# Patient Record
Sex: FEMALE | Race: WHITE | Employment: OTHER | URBAN - METROPOLITAN AREA
[De-identification: names, ages, dates, MRNs, and addresses within clinical notes are randomized per-mention and may not be internally consistent; named-entity substitution may affect disease eponyms.]

---

## 2017-03-28 DIAGNOSIS — F34.1 DYSTHYMIA: ICD-10-CM

## 2017-03-28 RX ORDER — BUPROPION HYDROCHLORIDE 300 MG/1
300 TABLET ORAL EVERY MORNING
Qty: 90 TABLET | Refills: 1 | Status: SHIPPED | OUTPATIENT
Start: 2017-03-28 | End: 2017-09-11

## 2017-03-28 RX ORDER — ESCITALOPRAM OXALATE 20 MG/1
20 TABLET ORAL DAILY
Qty: 90 TABLET | Refills: 1 | Status: SHIPPED | OUTPATIENT
Start: 2017-03-28 | End: 2017-09-11

## 2017-03-28 NOTE — TELEPHONE ENCOUNTER
Escitalopram  20mg     Last Written Prescription Date: 12/13/2016 #90 x 0  Last filled 12/13/2016  Last Office Visit with [facility] primary care provider:  09/12/2016 NAEL Clinton        Last PHQ-9 score on record=   PHQ-9 SCORE 9/12/2016   Total Score -   Total Score 4     Bupropion XL 300mg       Last Written Prescription Date: 12/13/2016 #90 x 0  Last filled 12/13/2016  Last Office Visit with [facility], [facility] or [facility] prescribing provider:  09/12/2016 NAEL Clinton        Last PHQ-9 score on record=   PHQ-9 SCORE 9/12/2016   Total Score -   Total Score 4       Lab Results   Component Value Date    AST 23 06/08/2012     Lab Results   Component Value Date    ALT 14 06/08/2012

## 2017-03-28 NOTE — TELEPHONE ENCOUNTER
Prescription approved per [facility] Refill Protocol or patient Primary care provider (PCP)  DERRICK Weiss RN/Christian Bond

## 2017-09-08 NOTE — PATIENT INSTRUCTIONS
Preventive Health Recommendations  Female Ages 65 +      *   Yearly eye visit.     *   Overall, I don't see any changes.     *    Turn in the FIT card.     *    Designated a health care agent who can speak for you if you can't communicate your wishes.     *    Not sure about the rash, looks like psoriasis.       Yearly exam:     See your health care provider every year in order to  o Review health changes.   o Discuss preventive care.    o Review your medicines if your doctor has prescribed any.      You no longer need a yearly Pap test unless you've had an abnormal Pap test in the past 10 years. If you have vaginal symptoms, such as bleeding or discharge, be sure to talk with your provider about a Pap test.      Every 1 to 2 years, have a mammogram.  If you are over 69, talk with your health care provider about whether or not you want to continue having screening mammograms.      Every 10 years, have a colonoscopy. Or, have a yearly FIT test (stool test). These exams will check for colon cancer.       Have a cholesterol test every 5 years, or more often if your doctor advises it.       Have a diabetes test (fasting glucose) every three years. If you are at risk for diabetes, you should have this test more often.       At age 65, have a bone density scan (DEXA) to check for osteoporosis (brittle bone disease).    Shots:    Get a flu shot each year.    Get a tetanus shot every 10 years.    Talk to your doctor about your pneumonia vaccines. There are now two you should receive - Pneumovax (PPSV 23) and Prevnar (PCV 13).    Talk to your doctor about the shingles vaccine.    Talk to your doctor about the hepatitis B vaccine.    Nutrition:     Eat at least 5 servings of fruits and vegetables each day.      Eat whole-grain bread, whole-wheat pasta and brown rice instead of white grains and rice.      Talk to your provider about Calcium and Vitamin D.     Lifestyle    Exercise at least 150 minutes a week (30 minutes a  day, 5 days a week). This will help you control your weight and prevent disease.      Limit alcohol to one drink per day.      No smoking.       Wear sunscreen to prevent skin cancer.       See your dentist twice a year for an exam and cleaning.      See your eye doctor every 1 to 2 years to screen for conditions such as glaucoma, macular degeneration, cataracts, etc

## 2017-09-08 NOTE — PROGRESS NOTES
SUBJECTIVE:   CC: Cecelia Zurita is an 76 year old woman who presents for preventive health visit.     Healthy Habits:    Do you get at least three servings of calcium containing foods daily (dairy, green leafy vegetables, etc.)? NO    Amount of exercise or daily activities, outside of work: Walking dog 20 minutes per day    Problems taking medications regularly No    Medication side effects: No    Have you had an eye exam in the past two years? yes    Do you see a dentist twice per year? yes    Do you have sleep apnea, excessive snoring or daytime drowsiness?no      Depression Followup  Lexapro 20mg qd, Wellbutrin XL 300mg qd    Status since last visit: Stable     See PHQ-9 for current symptoms.  Other associated symptoms: None    Complicating factors:   Significant life event:  No   Current substance abuse:  None  Anxiety or Panic symptoms:  No    PHQ-9  English  PHQ-9   Any Language    Hypothyroidism Follow-up  Nature-throid 97.5mg qd  Patient is not taking any current medications for this    Since last visit, patient describes the following symptoms: dry skin    - Rash on scalp x years. This is itching.    - Mammogram last completed 9/16/16, patient is recommended to complete this yearly.    - Patient last completed colonoscopy 6/2/2004. Results were normal, she denies any FHx of colon cancer. She is requesting FIT testing.    Today's PHQ-2 Score: PHQ-2 ( 1999 Pfizer) 9/12/2016   Q1: Little interest or pleasure in doing things 1   Q2: Feeling down, depressed or hopeless 1   PHQ-2 Score 2         Abuse: Current or Past(Physical, Sexual or Emotional)- No  Do you feel safe in your environment - Yes  Social History   Substance Use Topics     Smoking status: Never Smoker     Smokeless tobacco: Not on file     Alcohol use Yes      Comment: 2-3 drinks per week     The patient does not drink >3 drinks per day nor >7 drinks per week.    Reviewed orders with patient.  Reviewed health maintenance and updated orders  "accordingly - Yes      Patient over age 75, has elected to continue with mammography screening.    Pertinent mammograms are reviewed under the imaging tab.  History of abnormal Pap smear: NO - age 30- 65 PAP every 3 years recommended  Last 3 Pap Results: No results found for: PAP    Reviewed and updated as needed this visit by clinical staff         Reviewed and updated as needed this visit by Provider            ROS:  C: NEGATIVE for fever, chills, change in weight  I: NEGATIVE for worrisome rashes, moles or lesions  E: NEGATIVE for vision changes or irritation  ENT: NEGATIVE for ear, mouth and throat problems  R: NEGATIVE for significant cough or SOB  B: NEGATIVE for masses, tenderness or discharge  CV: NEGATIVE for chest pain, palpitations or peripheral edema  GI: NEGATIVE for nausea, abdominal pain, heartburn, or change in bowel habits  : NEGATIVE for unusual urinary or vaginal symptoms. No vaginal bleeding.  M: NEGATIVE for significant arthralgias or myalgia  N: NEGATIVE for weakness, dizziness or paresthesias  P: NEGATIVE for changes in mood or affect     This document serves as a record of the services and decisions personally performed and made by Diya Berry MD. It was created on his behalf by Chris Lenz, a trained medical scribe. The creation of this document is based the provider's statements to the medical scribe.  Chris Lenz 10:50 AM September 11, 2017  OBJECTIVE:   /80  Pulse 80  Ht 5' 3.5\" (1.613 m)  Wt 151 lb 6 oz (68.7 kg)  BMI 26.39 kg/m2       EXAM:  GENERAL: healthy, alert and no distress  EYES: Eyes grossly normal to inspection, conjunctivae and sclerae normal  RESP: lungs clear to auscultation - no rales, rhonchi or wheezes  CV: regular rate and rhythm, normal S1 S2, no murmur  MS: no gross musculoskeletal defects noted, no edema  NEURO: Normal strength and tone, mentation intact and speech normal  PSYCH: mentation appears normal, affect " "normal/bright        ASSESSMENT/PLAN:     (Z00.00) Routine general medical examination at a health care facility  (primary encounter diagnosis)  Comment: Reviewed lifestyle, current conditions, medications, routine screenings, labs.  Plan: Annual physical in 1 year, sooner for other health maintenance.     (Z13.6) CARDIOVASCULAR SCREENING; LDL GOAL LESS THAN 160  Comment: Patient is not on statin therapy. Results pending.   Plan: Lipid panel reflex to direct LDL    (E03.9) Hypothyroidism  Comment: Routine check, results pending.   Plan: TSH    (Z13.1) Screening for diabetes mellitus  Comment: Routine check, results pending.   Plan: Glucose    (F34.1) Dysthymia  Comment: Doing well with SSRI therapy and wellbutrin. Medication refilled, results pending.   Plan: buPROPion (WELLBUTRIN XL) 300 MG 24 hr tablet,         escitalopram (LEXAPRO) 20 MG tablet, DEPRESSION        ACTION PLAN (DAP)            (K31.89) Gastric hyperacidity  Comment: Doing well with PPI therapy. Medication refilled.    Plan: omeprazole 20 MG tablet    (Z12.11) Colon cancer screening  Comment: FIT card provided.   Plan: Fecal colorectal cancer screen (FIT)    (M35.3) Polymyalgia rheumatica (H)  Comment: Resolved.   Plan: Continue to monitor        COUNSELING:   Reviewed preventive health counseling, as reflected in patient instructions       Regular exercise       Healthy diet/nutrition         reports that she has never smoked. She does not have any smokeless tobacco history on file.    Estimated body mass index is 26.64 kg/(m^2) as calculated from the following:    Height as of 9/12/16: 5' 3.5\" (1.613 m).    Weight as of 9/12/16: 152 lb 12.8 oz (69.3 kg).         Counseling Resources:  ATP IV Guidelines  Pooled Cohorts Equation Calculator  Breast Cancer Risk Calculator  FRAX Risk Assessment  ICSI Preventive Guidelines  Dietary Guidelines for Americans, 2010  USDA's MyPlate  ASA Prophylaxis  Lung CA Screening  The information in this document, " created by a scribe for me, accurately reflects the services I personally performed and the decisions made by me. I have reviewed and approved this document for accuracy. 10:49 AM 9/11/2017    Diya Berry MD  [facility]

## 2017-09-11 ENCOUNTER — OFFICE VISIT (OUTPATIENT)
Dept: FAMILY MEDICINE | Facility: CLINIC | Age: 77
End: 2017-09-11
Payer: COMMERCIAL

## 2017-09-11 VITALS
WEIGHT: 151.38 LBS | BODY MASS INDEX: 25.84 KG/M2 | DIASTOLIC BLOOD PRESSURE: 80 MMHG | HEART RATE: 80 BPM | HEIGHT: 64 IN | SYSTOLIC BLOOD PRESSURE: 130 MMHG

## 2017-09-11 DIAGNOSIS — Z13.1 SCREENING FOR DIABETES MELLITUS: ICD-10-CM

## 2017-09-11 DIAGNOSIS — K31.89 GASTRIC HYPERACIDITY: ICD-10-CM

## 2017-09-11 DIAGNOSIS — Z12.11 COLON CANCER SCREENING: ICD-10-CM

## 2017-09-11 DIAGNOSIS — Z13.6 CARDIOVASCULAR SCREENING; LDL GOAL LESS THAN 160: ICD-10-CM

## 2017-09-11 DIAGNOSIS — E03.9 HYPOTHYROIDISM, UNSPECIFIED TYPE: ICD-10-CM

## 2017-09-11 DIAGNOSIS — F34.1 DYSTHYMIA: ICD-10-CM

## 2017-09-11 DIAGNOSIS — M35.3 POLYMYALGIA RHEUMATICA (H): ICD-10-CM

## 2017-09-11 DIAGNOSIS — Z00.00 ROUTINE GENERAL MEDICAL EXAMINATION AT A HEALTH CARE FACILITY: Primary | ICD-10-CM

## 2017-09-11 LAB
CHOLEST SERPL-MCNC: 238 MG/DL
GLUCOSE SERPL-MCNC: 87 MG/DL (ref 70–99)
HDLC SERPL-MCNC: 85 MG/DL
LDLC SERPL CALC-MCNC: 136 MG/DL
NONHDLC SERPL-MCNC: 153 MG/DL
TRIGL SERPL-MCNC: 83 MG/DL
TSH SERPL DL<=0.005 MIU/L-ACNC: 0.83 MU/L (ref 0.4–4)

## 2017-09-11 PROCEDURE — 36415 COLL VENOUS BLD VENIPUNCTURE: CPT | Performed by: FAMILY MEDICINE

## 2017-09-11 PROCEDURE — 99397 PER PM REEVAL EST PAT 65+ YR: CPT | Performed by: FAMILY MEDICINE

## 2017-09-11 PROCEDURE — 84443 ASSAY THYROID STIM HORMONE: CPT | Performed by: FAMILY MEDICINE

## 2017-09-11 PROCEDURE — 80061 LIPID PANEL: CPT | Performed by: FAMILY MEDICINE

## 2017-09-11 PROCEDURE — 82947 ASSAY GLUCOSE BLOOD QUANT: CPT | Performed by: FAMILY MEDICINE

## 2017-09-11 RX ORDER — BUPROPION HYDROCHLORIDE 300 MG/1
300 TABLET ORAL EVERY MORNING
Qty: 90 TABLET | Refills: 1 | Status: SHIPPED | OUTPATIENT
Start: 2017-09-11 | End: 2018-07-02

## 2017-09-11 RX ORDER — ESCITALOPRAM OXALATE 20 MG/1
20 TABLET ORAL DAILY
Qty: 90 TABLET | Refills: 1 | Status: SHIPPED | OUTPATIENT
Start: 2017-09-11 | End: 2018-07-02

## 2017-09-11 RX ORDER — NICOTINE POLACRILEX 4 MG/1
20 GUM, CHEWING ORAL DAILY
Qty: 90 TABLET | Refills: 1 | Status: SHIPPED | OUTPATIENT
Start: 2017-09-11

## 2017-09-11 ASSESSMENT — PATIENT HEALTH QUESTIONNAIRE - PHQ9: SUM OF ALL RESPONSES TO PHQ QUESTIONS 1-9: 1

## 2017-09-11 NOTE — LETTER
My Depression Action Plan  Name: Cecelia Zurita   Date of Birth 1940  Date: 9/11/2017    My doctor: Diya Berry   My clinic: [address] [address] [phone]          GREEN    ZONE   Good Control    What it looks like:     Things are going generally well. You have normal up s and down s. You may even feel depressed from time to time, but bad moods usually last less than a day.   What you need to do:  1. Continue to care for yourself (see self care plan)  2. Check your depression survival kit and update it as needed  3. Follow your physician s recommendations including any medication.  4. Do not stop taking medication unless you consult with your physician first.           YELLOW         ZONE Getting Worse    What it looks like:     Depression is starting to interfere with your life.     It may be hard to get out of bed; you may be starting to isolate yourself from others.    Symptoms of depression are starting to last most all day and this has happened for several days.     You may have suicidal thoughts but they are not constant.   What you need to do:     1. Call your care team, your response to treatment will improve if you keep your care team informed of your progress. Yellow periods are signs an adjustment may need to be made.     2. Continue your self-care, even if you have to fake it!    3. Talk to someone in your support network    4. Open up your depression survival kit           RED    ZONE Medical Alert - Get Help    What it looks like:     Depression is seriously interfering with your life.     You may experience these or other symptoms: You can t get out of bed most days, can t work or engage in other necessary activities, you have trouble taking care of basic hygiene, or basic responsibilities, thoughts of suicide or death that will not go away, self-injurious behavior.     What you need to do:  1. Call your care team and  request a same-day appointment. If they are not available (weekends or after hours) call your local crisis line, emergency room or 911.      Electronically signed by: Sandra Harman, September 11, 2017    Depression Self Care Plan / Survival Kit    Self-Care for Depression  Here s the deal. Your body and mind are really not as separate as most people think.  What you do and think affects how you feel and how you feel influences what you do and think. This means if you do things that people who feel good do, it will help you feel better.  Sometimes this is all it takes.  There is also a place for medication and therapy depending on how severe your depression is, so be sure to consult with your medical provider and/ or Behavioral Health Consultant if your symptoms are worsening or not improving.     In order to better manage my stress, I will:    Exercise  Get some form of exercise, every day. This will help reduce pain and release endorphins, the  feel good  chemicals in your brain. This is almost as good as taking antidepressants!  This is not the same as joining a gym and then never going! (they count on that by the way ) It can be as simple as just going for a walk or doing some gardening, anything that will get you moving.      Hygiene   Maintain good hygiene (Get out of bed in the morning, Make your bed, Brush your teeth, Take a shower, and Get dressed like you were going to work, even if you are unemployed).  If your clothes don't fit try to get ones that do.    Diet  I will strive to eat foods that are good for me, drink plenty of water, and avoid excessive sugar, caffeine, alcohol, and other mood-altering substances.  Some foods that are helpful in depression are: complex carbohydrates, B vitamins, flaxseed, fish or fish oil, fresh fruits and vegetables.    Psychotherapy  I agree to participate in Individual Therapy (if recommended).    Medication  If prescribed medications, I agree to take them.  Missing  doses can result in serious side effects.  I understand that drinking alcohol, or other illicit drug use, may cause potential side effects.  I will not stop my medication abruptly without first discussing it with my provider.    Staying Connected With Others  I will stay in touch with my friends, family members, and my primary care provider/team.    Use your imagination  Be creative.  We all have a creative side; it doesn t matter if it s oil painting, sand castles, or mud pies! This will also kick up the endorphins.    Witness Beauty  (AKA stop and smell the roses) Take a look outside, even in mid-winter. Notice colors, textures. Watch the squirrels and birds.     Service to others  Be of service to others.  There is always someone else in need.  By helping others we can  get out of ourselves  and remember the really important things.  This also provides opportunities for practicing all the other parts of the program.    Humor  Laugh and be silly!  Adjust your TV habits for less news and crime-drama and more comedy.    Control your stress  Try breathing deep, massage therapy, biofeedback, and meditation. Find time to relax each day.     My support system    Clinic Contact:  Phone number:    Contact 1:  Phone number:    Contact 2:  Phone number:    Hindu/:  Phone number:    Therapist:  Phone number:    Local crisis center:    Phone number:    Other community support:  Phone number:

## 2017-09-11 NOTE — MR AVS SNAPSHOT
After Visit Summary   9/11/2017    Cecelia Zurita    MRN: [id number]           Patient Information     Date Of Birth          1940        Visit Information        Provider Department      9/11/2017 10:40 AM Diya Berry MD [facility]        Today's Diagnoses     Routine general medical examination at a health care facility    -  1    CARDIOVASCULAR SCREENING; LDL GOAL LESS THAN 160        Screening for diabetes mellitus        Dysthymia        Gastric hyperacidity        Colon cancer screening        Hypothyroidism, unspecified type        Polymyalgia rheumatica (H)          Care Instructions      Preventive Health Recommendations  Female Ages 65 +      *   Yearly eye visit.     *   Overall, I don't see any changes.     *    Turn in the FIT card.     *    Designated a health care agent who can speak for you if you can't communicate your wishes.     *    Not sure about the rash, looks like psoriasis.       Yearly exam:     See your health care provider every year in order to  o Review health changes.   o Discuss preventive care.    o Review your medicines if your doctor has prescribed any.      You no longer need a yearly Pap test unless you've had an abnormal Pap test in the past 10 years. If you have vaginal symptoms, such as bleeding or discharge, be sure to talk with your provider about a Pap test.      Every 1 to 2 years, have a mammogram.  If you are over 69, talk with your health care provider about whether or not you want to continue having screening mammograms.      Every 10 years, have a colonoscopy. Or, have a yearly FIT test (stool test). These exams will check for colon cancer.       Have a cholesterol test every 5 years, or more often if your doctor advises it.       Have a diabetes test (fasting glucose) every three years. If you are at risk for diabetes, you should have this test more often.       At age 65, have a bone density scan (DEXA) to check for  osteoporosis (brittle bone disease).    Shots:    Get a flu shot each year.    Get a tetanus shot every 10 years.    Talk to your doctor about your pneumonia vaccines. There are now two you should receive - Pneumovax (PPSV 23) and Prevnar (PCV 13).    Talk to your doctor about the shingles vaccine.    Talk to your doctor about the hepatitis B vaccine.    Nutrition:     Eat at least 5 servings of fruits and vegetables each day.      Eat whole-grain bread, whole-wheat pasta and brown rice instead of white grains and rice.      Talk to your provider about Calcium and Vitamin D.     Lifestyle    Exercise at least 150 minutes a week (30 minutes a day, 5 days a week). This will help you control your weight and prevent disease.      Limit alcohol to one drink per day.      No smoking.       Wear sunscreen to prevent skin cancer.       See your dentist twice a year for an exam and cleaning.      See your eye doctor every 1 to 2 years to screen for conditions such as glaucoma, macular degeneration, cataracts, etc           Follow-ups after your visit        Future tests that were ordered for you today     Open Future Orders        Priority Expected Expires Ordered    Fecal colorectal cancer screen (FIT) Routine 10/2/2017 12/4/2017 9/11/2017            Who to contact     Normal or non-critical lab and imaging results will be communicated to you by MyChart, letter or phone within 4 business days after the clinic has received the results. If you do not hear from us within 7 days, please contact the clinic through MyChart or phone. If you have a critical or abnormal lab result, we will notify you by phone as soon as possible.  Submit refill requests through [employer] or call your pharmacy and they will forward the refill request to us. Please allow 3 business days for your refill to be completed.          If you need to speak with a  for additional information , please call: [phone]           Additional  "Information About Your Visit        MyChart Information     [employer] lets you send messages to your doctor, view your test results, renew your prescriptions, schedule appointments and more. To sign up, go to [email or web address] . Click on \"Log in\" on the left side of the screen, which will take you to the Welcome page. Then click on \"Sign up Now\" on the right side of the page.     You will be asked to enter the access code listed below, as well as some personal information. Please follow the directions to create your username and password.     Your access code is: [email or web address]  Expires: 12/10/2017 11:01 AM     Your access code will  in 90 days. If you need help or a new code, please call your [place] clinic or [phone].        Care EveryWhere ID     This is your Care EveryWhere ID. This could be used by other organizations to access your [place] medical records  [id number]        Your Vitals Were     Pulse Height BMI (Body Mass Index)             80 5' 3.5\" (1.613 m) 26.39 kg/m2          Blood Pressure from Last 3 Encounters:   17 130/80   16 112/74   16 130/80    Weight from Last 3 Encounters:   17 151 lb 6 oz (68.7 kg)   16 152 lb 12.8 oz (69.3 kg)   16 149 lb (67.6 kg)              We Performed the Following     DEPRESSION ACTION PLAN (DAP)     Glucose     Lipid panel reflex to direct LDL     TSH          Today's Medication Changes          These changes are accurate as of: 17 11:05 AM.  If you have any questions, ask your nurse or doctor.               These medicines have changed or have updated prescriptions.        Dose/Directions    buPROPion 300 MG 24 hr tablet   Commonly known as:  WELLBUTRIN XL   This may have changed:  additional instructions   Used for:  Dysthymia   Changed by:  Diya Berry MD        Dose:  300 mg   Take 1 tablet (300 mg) by mouth every morning   Quantity:  90 tablet   Refills:  1       escitalopram 20 MG tablet   Commonly " known as:  LEXAPRO   This may have changed:  additional instructions   Used for:  Dysthymia   Changed by:  Diya Berry MD        Dose:  20 mg   Take 1 tablet (20 mg) by mouth daily   Quantity:  90 tablet   Refills:  1         Stop taking these medicines if you haven't already. Please contact your care team if you have questions.     azithromycin 250 MG tablet   Commonly known as:  ZITHROMAX   Stopped by:  Diya Berry MD                Where to get your medicines      These medications were sent to [facility] [address] [place] [address] [address] AT [facility]  [address], [facility] [address]     Phone:  [phone]     buPROPion 300 MG 24 hr tablet    escitalopram 20 MG tablet    omeprazole 20 MG tablet                Primary Care Provider Office Phone # Fax #    Diya Berry [phone] [address] [place] [address]        Equal Access to Services     [facility]: Hadii aad ku hadasho Soomaali, waaxda luqadaha, qaybta kaalmada adeegyada, waxay idiin hayaan adeeg kharash la'kim . So [facility] [phone].    ATENCIÓN: Si habla español, tiene a wells disposición servicios gratuitos de asistencia lingüística. [facility] [phone].    We comply with applicable federal civil rights laws and [place] laws. We do not discriminate on the basis of race, color, national origin, age, disability sex, sexual orientation or gender identity.            Thank you!     Thank you for choosing [facility]  for your care. Our goal is always to provide you with excellent care. Hearing back from our patients is one way we can continue to improve our services. Please take a few minutes to complete the written survey that you may receive in the mail after your visit with us. Thank you!             Your Updated Medication List - Protect others around you: Learn how to safely use, store and throw away your medicines at  www.disposemymeds.org.          This list is accurate as of: 9/11/17 11:05 AM.  Always use your most recent med list.                   Brand Name Dispense Instructions for use Diagnosis    B COMPLEX PO      Take  by mouth.        buPROPion 300 MG 24 hr tablet    WELLBUTRIN XL    90 tablet    Take 1 tablet (300 mg) by mouth every morning    Dysthymia       escitalopram 20 MG tablet    LEXAPRO    90 tablet    Take 1 tablet (20 mg) by mouth daily    Dysthymia       meclizine 25 MG tablet    ANTIVERT    30 tablet    Take 1 tablet by mouth every 6 hours as needed. For vertigo    Vertigo       NATURE-THROID 65 MG Tabs   Generic drug:  Thyroid      97.5 Doses        OMEGA 3 PO      Take  by mouth. Omega Red daily.        omeprazole 20 MG tablet     90 tablet    Take 1 tablet (20 mg) by mouth daily For stomach.    Gastric hyperacidity       VITAMIN D3 PO      Take 2,000 Units by mouth daily

## 2017-09-11 NOTE — NURSING NOTE
"Chief Complaint   Patient presents with     Physical       Initial /80  Pulse 80  Ht 5' 3.5\" (1.613 m)  Wt 151 lb 6 oz (68.7 kg)  BMI 26.39 kg/m2 Estimated body mass index is 26.39 kg/(m^2) as calculated from the following:    Height as of this encounter: 5' 3.5\" (1.613 m).    Weight as of this encounter: 151 lb 6 oz (68.7 kg).  Medication Reconciliation: complete  "

## 2017-09-11 NOTE — LETTER
September 18, 2017      Cecelia Zurita  [address] [place] [address]        Dear ,    We are writing to inform you of your test results.    Your blood to show that you have normal thyroid function and there is no signs of diabetes. Please see enclosed copies.     Your cholesterol is slightly elevated, but you have a great HDL, the protective cholesterol. There is no need for cholesterol-lowering medication at this time.     Overall, I think you're doing well. I would recommend that you continue on your current medications and come back for a follow-up physical in one year.    Resulted Orders   Lipid panel reflex to direct LDL   Result Value Ref Range    Cholesterol 238 (H) <200 mg/dL      Comment:      Desirable:       <200 mg/dl    Triglycerides 83 <150 mg/dL    HDL Cholesterol 85 >49 mg/dL    LDL Cholesterol Calculated 136 (H) <100 mg/dL      Comment:      Above desirable:  100-129 mg/dl  Borderline High:  130-159 mg/dL  High:             160-189 mg/dL  Very high:       >189 mg/dl      Non HDL Cholesterol 153 (H) <130 mg/dL      Comment:      Above Desirable:  130-159 mg/dl  Borderline high:  160-189 mg/dl  High:             190-219 mg/dl  Very high:       >219 mg/dl     TSH   Result Value Ref Range    TSH 0.83 0.40 - 4.00 mU/L   Glucose   Result Value Ref Range    Glucose 87 70 - 99 mg/dL       If you have any questions or concerns, please call the clinic at the number listed above.       Sincerely,        Diya Berry MD

## 2017-09-14 PROCEDURE — G0328 FECAL BLOOD SCRN IMMUNOASSAY: HCPCS | Performed by: FAMILY MEDICINE

## 2017-09-15 DIAGNOSIS — Z12.11 COLON CANCER SCREENING: ICD-10-CM

## 2017-09-17 LAB — HEMOCCULT STL QL IA: NEGATIVE

## 2017-09-18 ENCOUNTER — EXTERNAL ORDER RESULTS (OUTPATIENT)
Dept: HEALTH INFORMATION MANAGEMENT | Facility: CLINIC | Age: 77
End: 2017-09-18

## 2017-09-18 ENCOUNTER — TRANSFERRED RECORDS (OUTPATIENT)
Dept: HEALTH INFORMATION MANAGEMENT | Facility: CLINIC | Age: 77
End: 2017-09-18

## 2017-09-19 NOTE — PROGRESS NOTES
Please abstract the following data from this visit with this patient into the appropriate field in Epic:    Mammogram done on this date: 9/18/2017 (approximately), by this group: [facility], results were NORMAL, repeat yearly.

## 2017-10-05 ENCOUNTER — APPOINTMENT (RX ONLY)
Dept: URBAN - METROPOLITAN AREA CLINIC 167 | Facility: CLINIC | Age: 77
Setting detail: DERMATOLOGY
End: 2017-10-05

## 2017-10-05 DIAGNOSIS — L21.8 OTHER SEBORRHEIC DERMATITIS: ICD-10-CM

## 2017-10-05 DIAGNOSIS — L08.9 LOCAL INFECTION OF THE SKIN AND SUBCUTANEOUS TISSUE, UNSPECIFIED: ICD-10-CM

## 2017-10-05 PROBLEM — E03.9 HYPOTHYROIDISM, UNSPECIFIED: Status: ACTIVE | Noted: 2017-10-05

## 2017-10-05 PROCEDURE — 99202 OFFICE O/P NEW SF 15 MIN: CPT

## 2017-10-05 PROCEDURE — ? OTHER

## 2017-10-05 PROCEDURE — ? TREATMENT REGIMEN

## 2017-10-05 PROCEDURE — ? ORDER TESTS

## 2017-10-05 PROCEDURE — ? COUNSELING

## 2017-10-05 PROCEDURE — ? PRESCRIPTION

## 2017-10-05 RX ORDER — CLOBETASOL PROPIONATE 0.5 MG/ML
SOLUTION TOPICAL
Qty: 1 | Refills: 3 | Status: ERX | COMMUNITY
Start: 2017-10-05

## 2017-10-05 RX ORDER — BETAMETHASONE DIPROPIONATE 0.5 MG/G
OINTMENT TOPICAL
Qty: 1 | Refills: 1 | Status: ERX | COMMUNITY
Start: 2017-10-05

## 2017-10-05 RX ORDER — DOXYCYCLINE 100 MG/1
TABLET, FILM COATED ORAL
Qty: 10 | Refills: 0 | Status: ERX | COMMUNITY
Start: 2017-10-05

## 2017-10-05 RX ADMIN — BETAMETHASONE DIPROPIONATE: 0.5 OINTMENT TOPICAL at 22:59

## 2017-10-05 RX ADMIN — CLOBETASOL PROPIONATE: 0.5 SOLUTION TOPICAL at 22:58

## 2017-10-05 RX ADMIN — DOXYCYCLINE: 100 TABLET, FILM COATED ORAL at 22:57

## 2017-10-05 ASSESSMENT — LOCATION DETAILED DESCRIPTION DERM
LOCATION DETAILED: LEFT CENTRAL FRONTAL SCALP
LOCATION DETAILED: LEFT CENTRAL PARIETAL SCALP

## 2017-10-05 ASSESSMENT — LOCATION ZONE DERM: LOCATION ZONE: SCALP

## 2017-10-05 ASSESSMENT — LOCATION SIMPLE DESCRIPTION DERM: LOCATION SIMPLE: SCALP

## 2017-10-05 NOTE — PROCEDURE: TREATMENT REGIMEN
Initiate Treatment: Doxycycline 100mg take 1 by mouth once a day x 10 days
Detail Level: Zone
Otc Regimen: Vinegar soaks 1 tablespoon of vinegar for every cup of water ( put mixture into a spray bottle and apply before showering or while in the shower and let it sit for 5-10 minutes)
Initiate Treatment: Clobetasol 0.05% scalp solution \\nBetamethasone, augmented apply to affected area of the scalp and ears twice a day up to two weeks then one

## 2017-10-05 NOTE — PROCEDURE: OTHER
Other (Free Text): Rash on scalp and in ears\\nItchy\\nFlaking \\nSeveral years \\nPatient is currently not treating her scalp \\nPatient was given a prescription for Topicort spray but did not work \\nAlso cicloprox shampoo also no help \\nSpreading\\nPatient is shampooing with a shampoo with tea tree oil in it\\n\\nIs thick and scaly\\nWill decrease inflammation with topical steroids\\nMay be secondarily infected as well - erosions, crusted - will treat with doxy (amox allergy)
Note Text (......Xxx Chief Complaint.): This diagnosis correlates with the
Detail Level: Detailed

## 2017-10-25 ENCOUNTER — APPOINTMENT (RX ONLY)
Dept: URBAN - METROPOLITAN AREA CLINIC 167 | Facility: CLINIC | Age: 77
Setting detail: DERMATOLOGY
End: 2017-10-25

## 2017-10-25 DIAGNOSIS — L21.8 OTHER SEBORRHEIC DERMATITIS: ICD-10-CM | Status: IMPROVED

## 2017-10-25 DIAGNOSIS — L08.9 LOCAL INFECTION OF THE SKIN AND SUBCUTANEOUS TISSUE, UNSPECIFIED: ICD-10-CM | Status: RESOLVED

## 2017-10-25 DIAGNOSIS — I78.8 OTHER DISEASES OF CAPILLARIES: ICD-10-CM

## 2017-10-25 PROCEDURE — ? COUNSELING

## 2017-10-25 PROCEDURE — ? OTHER

## 2017-10-25 PROCEDURE — ? TREATMENT REGIMEN

## 2017-10-25 PROCEDURE — 99213 OFFICE O/P EST LOW 20 MIN: CPT

## 2017-10-25 ASSESSMENT — LOCATION DETAILED DESCRIPTION DERM
LOCATION DETAILED: LEFT CENTRAL PARIETAL SCALP
LOCATION DETAILED: LEFT ANTERIOR DISTAL THIGH
LOCATION DETAILED: LEFT CENTRAL FRONTAL SCALP

## 2017-10-25 ASSESSMENT — LOCATION SIMPLE DESCRIPTION DERM
LOCATION SIMPLE: LEFT THIGH
LOCATION SIMPLE: SCALP

## 2017-10-25 ASSESSMENT — LOCATION ZONE DERM
LOCATION ZONE: LEG
LOCATION ZONE: SCALP

## 2017-10-25 NOTE — PROCEDURE: OTHER
Other (Free Text): Seborrheic dermatitis on scalp\\nOriginally did not respond to topicort spray or ciclopirox shampoo for years\\nClobetasol scalp solution - a little stinging but tolerable\\nBetamethasone behind ears and on scalp helped a lot\\nIs almost resolved\\n\\n
Detail Level: Detailed
Note Text (......Xxx Chief Complaint.): This diagnosis correlates with the
Other (Free Text): Had cx-proven skin infection with MSSA (sensitive to doxy) and Acinetobacter Iwoffi (TCN sensitivity not tested)\\nTook doxycycline 100mg one by mouth once daily x 10 days (ampicillin allergy)\\nRapid improvement\\nInfection appears resolved\\nIf it gets crusted again, can start with vinegar soaks in a spray bottle prior to a shower\\n
Other (Free Text): Rash on left thigh that comes with heat, but is currently clear\\nI cannot confirm what it is, but it sounds like they are dilated blood vessels.\\n

## 2017-10-25 NOTE — PROCEDURE: TREATMENT REGIMEN
Detail Level: Zone
Otc Regimen: Vinegar soaks 1 tablespoon of vinegar for every cup of water ( put mixture into a spray bottle and apply before showering or while in the shower and let it sit for 5-10 minutes) for prevention
Continue Regimen: Clobetasol 0.05% scalp Solution Apply to the affected area of the scalp twice daily up to two weeks per month / as needed \\nBetamethasone, augmented Apply to the affected area of the scalp and ears twice daily up to two weeks per month / as needed

## 2018-05-03 ENCOUNTER — APPOINTMENT (RX ONLY)
Dept: URBAN - METROPOLITAN AREA CLINIC 167 | Facility: CLINIC | Age: 78
Setting detail: DERMATOLOGY
End: 2018-05-03

## 2018-05-03 DIAGNOSIS — L08.9 LOCAL INFECTION OF THE SKIN AND SUBCUTANEOUS TISSUE, UNSPECIFIED: ICD-10-CM

## 2018-05-03 DIAGNOSIS — L21.8 OTHER SEBORRHEIC DERMATITIS: ICD-10-CM

## 2018-05-03 DIAGNOSIS — L57.0 ACTINIC KERATOSIS: ICD-10-CM

## 2018-05-03 PROCEDURE — ? COUNSELING

## 2018-05-03 PROCEDURE — ? LIQUID NITROGEN

## 2018-05-03 PROCEDURE — 17003 DESTRUCT PREMALG LES 2-14: CPT

## 2018-05-03 PROCEDURE — 99213 OFFICE O/P EST LOW 20 MIN: CPT | Mod: 25

## 2018-05-03 PROCEDURE — ? PRESCRIPTION

## 2018-05-03 PROCEDURE — ? TREATMENT REGIMEN

## 2018-05-03 PROCEDURE — ? ORDER TESTS

## 2018-05-03 PROCEDURE — 17000 DESTRUCT PREMALG LESION: CPT

## 2018-05-03 PROCEDURE — ? OTHER

## 2018-05-03 RX ORDER — KETOCONAZOLE 20.5 MG/ML
SHAMPOO, SUSPENSION TOPICAL
Qty: 1 | Refills: 11 | Status: ERX | COMMUNITY
Start: 2018-05-03

## 2018-05-03 RX ADMIN — KETOCONAZOLE: 20.5 SHAMPOO, SUSPENSION TOPICAL at 22:23

## 2018-05-03 ASSESSMENT — LOCATION SIMPLE DESCRIPTION DERM
LOCATION SIMPLE: NOSE
LOCATION SIMPLE: LEFT CHEEK
LOCATION SIMPLE: SCALP

## 2018-05-03 ASSESSMENT — LOCATION DETAILED DESCRIPTION DERM
LOCATION DETAILED: LEFT CENTRAL PARIETAL SCALP
LOCATION DETAILED: NASAL DORSUM
LOCATION DETAILED: LEFT MEDIAL MALAR CHEEK
LOCATION DETAILED: LEFT CENTRAL MALAR CHEEK

## 2018-05-03 ASSESSMENT — LOCATION ZONE DERM
LOCATION ZONE: NOSE
LOCATION ZONE: SCALP
LOCATION ZONE: FACE

## 2018-05-03 NOTE — PROCEDURE: OTHER
Detail Level: Detailed
Note Text (......Xxx Chief Complaint.): This diagnosis correlates with the
Other (Free Text): Had pancreatitis and changed her diet\\nPart of her pancreas may have \\nShe just wants to make sure this is not related to her scalp\\n\\nSeborrheic dermatitis on scalp\\nOriginally did not respond to topicort spray or ciclopirox shampoo for years\\nClobetasol scalp solution - a little stinging but tolerable\\nBetamethasone behind ears and on scalp helped a lot in the past\\nWas almost resolved at the last visit\\nIs now scaly and crusted, but not as bad as it has been\\nHas not been using the clobetasol or betamethasone much - can increase use\\nIt also looks like it may have a staph infection - will cx\\nPreviously has an MSSA and Acinetobacter lwoffii group infection

## 2018-05-03 NOTE — PROCEDURE: TREATMENT REGIMEN
Initiate Treatment: Ketoconazole shampoo\\n.
Detail Level: Zone
Continue Regimen: Clobetasol 0.05% scalp Solution Apply to the affected area of the scalp twice daily up to two weeks per month / as needed \\nBetamethasone, augmented Apply to the affected area of the scalp and ears twice daily up to two weeks per month / as needed

## 2018-07-27 DIAGNOSIS — F34.1 DYSTHYMIA: ICD-10-CM

## 2018-07-30 RX ORDER — BUPROPION HYDROCHLORIDE 300 MG/1
TABLET ORAL
Qty: 90 TABLET | Refills: 0 | Status: SHIPPED | OUTPATIENT
Start: 2018-07-30 | End: 2018-09-24

## 2018-07-30 RX ORDER — ESCITALOPRAM OXALATE 20 MG/1
TABLET ORAL
Qty: 90 TABLET | Refills: 0 | Status: SHIPPED | OUTPATIENT
Start: 2018-07-30 | End: 2018-09-24

## 2018-09-17 NOTE — PROGRESS NOTES
SUBJECTIVE:   Cecelia Zurita is a 77 year old female who presents for Preventive Visit.    Are you in the first 12 months of your Medicare Part B coverage?  No    Healthy Habits:    Do you get at least three servings of calcium containing foods daily (dairy, green leafy vegetables, etc.)? Yes, she is eating a low salt and low sugar diet    Amount of exercise or daily activities, outside of work: None this summer due to warm weather    Problems taking medications regularly No    Medication side effects: No    Have you had an eye exam in the past two years? yes    Do you see a dentist twice per year? yes    Do you have sleep apnea, excessive snoring or daytime drowsiness?no      Ability to successfully perform activities of daily living: Yes, no assistance needed    Home safety:  none identified     Hearing impairment: No    Fall risk:  Fallen 2 or more times in the past year?: No  Any fall with injury in the past year?: No  - She is requesting referral for MRCP after dx of pancreatitis 12/2017 and 3/11/2018      Depression Followup  Wellbutrin XL 300mg every day, lexapro 20mg qd    Status since last visit: Stable    See PHQ-9 for current symptoms.  Other associated symptoms: None    Complicating factors:   Significant life event:  No   Current substance abuse:  None  Anxiety or Panic symptoms:  Yes-  Anxiety due to recent dx of pancreatitis and not wanting another flare up    PHQ-9 6/20/2016 9/12/2016 9/11/2017   Total Score 8 4 1   Q9: Suicide Ideation Not at all Not at all Not at all       PHQ-9  English  PHQ-9   Any Language  Suicide Assessment Five-step Evaluation and Treatment (SAFE-T)    Hypothyroidism Follow-up      Since last visit, patient describes the following symptoms: weight loss of 20 lbs (due to dx of pancreatitis) and loose stools    - FIT test last completed 9/14/2017 with normal results. Patient is on a routine annual schedule for this and is due to repeat.      Reviewed and updated as needed  "this visit by clinical staff  Tobacco  Allergies  Meds  Med Hx  Surg Hx  Fam Hx  Soc Hx        Reviewed and updated as needed this visit by Provider        Social History   Substance Use Topics     Smoking status: Never Smoker     Smokeless tobacco: Never Used     Alcohol use Yes      Comment: 2-3 drinks per week       If you drink alcohol do you typically have >3 drinks per day or >7 drinks per week? No                        Today's PHQ-2 Score:   PHQ-2 ( 1999 Pf[employer]) 9/24/2018 9/11/2017   Q1: Little interest or pleasure in doing things 1 0   Q2: Feeling down, depressed or hopeless 1 1   PHQ-2 Score 2 1       Do you feel safe in your environment - Yes    Do you have a Health Care Directive?: Yes: Patient states has Advance Directive and will bring in a copy to clinic.    Current providers sharing in care for this patient include:   Patient Care Team:  Diya Berry MD as PCP - General (Family Practice)    The following health maintenance items are reviewed in Epic and correct as of today:  Health Maintenance   Topic Date Due     PNEUMOCOCCAL (2 of 2 - PCV13) 09/17/2011     PHQ-9 Q6 MONTHS  03/11/2018     ADVANCE DIRECTIVE PLANNING Q5 YRS  06/11/2018     INFLUENZA VACCINE (1) 09/01/2018     TSH Q1 YEAR  09/11/2018     FALL RISK ASSESSMENT  09/11/2018     DEPRESSION ACTION PLAN Q1 YR  09/11/2018     LIPID SCREEN Q5 YR FEMALE (SYSTEM ASSIGNED)  09/11/2022     TETANUS IMMUNIZATION (SYSTEM ASSIGNED)  01/01/2024     DEXA SCAN SCREENING (SYSTEM ASSIGNED)  Completed         Pneumonia Vaccine:Adults age 65+ who received Pneumovax (PPSV23) at 65 years or older: Should be given PCV13 > 1 year after their most recent PPSV23    ROS:  Constitutional, HEENT, cardiovascular, pulmonary, gi and gu systems are negative, except as otherwise noted.    OBJECTIVE:   /80  Pulse 76  Temp 98.9  F (37.2  C) (Tympanic)  Ht 5' 3.5\" (1.613 m)  Wt 131 lb (59.4 kg)  BMI 22.84 kg/m2 Estimated body mass index is 22.84 " "kg/(m^2) as calculated from the following:    Height as of this encounter: 5' 3.5\" (1.613 m).    Weight as of this encounter: 131 lb (59.4 kg).  EXAM:   GENERAL: Healthy, alert and no distress  EYES: Eyes grossly normal to inspection, conjunctivae and sclerae normal  RESP: Lungs clear to auscultation - no rales, rhonchi or wheezes  CV: Regular rate and rhythm, normal S1 S2, no murmur  GI:  soft, nontender, no HSM   MS: No gross musculoskeletal defects noted, no edema  NEURO: Normal strength and tone, mentation intact and speech normal  PSYCH: Mentation appears normal, affect normal/bright     Results for orders placed or performed in visit on 09/24/18   Lipid panel reflex to direct LDL Fasting   Result Value Ref Range    Cholesterol 200 (H) <200 mg/dL    Triglycerides 98 <150 mg/dL    HDL Cholesterol 73 >49 mg/dL    LDL Cholesterol Calculated 107 (H) <100 mg/dL    Non HDL Cholesterol 127 <130 mg/dL   TSH   Result Value Ref Range    TSH 0.84 0.40 - 4.00 mU/L   Comprehensive metabolic panel (BMP + Alb, Alk Phos, ALT, AST, Total. Bili, TP)   Result Value Ref Range    Sodium 136 133 - 144 mmol/L    Potassium 3.8 3.4 - 5.3 mmol/L    Chloride 103 94 - 109 mmol/L    Carbon Dioxide 26 20 - 32 mmol/L    Anion Gap 7 3 - 14 mmol/L    Glucose 85 70 - 99 mg/dL    Urea Nitrogen 18 7 - 30 mg/dL    Creatinine 0.98 0.52 - 1.04 mg/dL    GFR Estimate 55 (L) >60 mL/min/1.7m2    GFR Estimate If Black 67 >60 mL/min/1.7m2    Calcium 8.6 8.5 - 10.1 mg/dL    Bilirubin Total 0.5 0.2 - 1.3 mg/dL    Albumin 3.4 3.4 - 5.0 g/dL    Protein Total 6.4 (L) 6.8 - 8.8 g/dL    Alkaline Phosphatase 90 40 - 150 U/L    ALT 20 0 - 50 U/L    AST 16 0 - 45 U/L   Lipase   Result Value Ref Range    Lipase 113 73 - 393 U/L      ASSESSMENT / PLAN:   (Z00.00) Medicare annual wellness visit, subsequent  (primary encounter diagnosis)      (Z13.6) CARDIOVASCULAR SCREENING; LDL GOAL LESS THAN 160  Comment: Acceptable lipid profile without statin therapy.  Plan: Lipid " "panel reflex to direct LDL Fasting            (F34.1) Dysthymia  Comment: Doing well on current medications.  Plan: buPROPion (WELLBUTRIN XL) 300 MG 24 hr tablet,         escitalopram (LEXAPRO) 20 MG tablet, DEPRESSION        ACTION PLAN (DAP)        Continue.  Follow-up in 6 months.    (Z87.19) Hx of pancreatitis  Comment: Etiology unclear.  Patient does not have a gallbladder, minimal alcohol usage, triglycerides acceptable.  She reports that since I am her primary care physician, she needs referral from me to see her GI specialist in [place] and for a MRCP.  No current signs of liver or pancreatic inflammation based on blood tests.  Plan: Comprehensive metabolic panel (BMP + Alb, Alk         Phos, ALT, AST, Total. Bili, TP), Lipase, MR         Abdomen MRCP w/o & w Contrast, GASTROENTEROLOGY        ADULT REF CONSULT ONLY       (E03.9) Hypothyroidism, unspecified type  Comment: Clinically euthyroid.  TSH therapeutic.  Plan: TSH        Continue on current dose of thyroid medication.  1 year refill.    (Z71.89) Advanced directives, counseling/discussion  Comment: Discussed.      (Z23) Need for prophylactic vaccination and inoculation against influenza  Plan: FLU VACCINE, INCREASED ANTIGEN, PRESV FREE, AGE        65+ [90843], Vaccine Administration, Initial         [17318]            End of Life Planning:  Patient currently has an advanced directive: Yes.  Practitioner is supportive of decision.    COUNSELING:  Reviewed preventive health counseling, as reflected in patient instructions       Regular exercise       Healthy diet/nutrition       Vision screening       Hearing screening       Dental care       Immunizations    Vaccinated for: Influenza          BP Readings from Last 1 Encounters:   09/24/18 124/80     Estimated body mass index is 22.84 kg/(m^2) as calculated from the following:    Height as of this encounter: 5' 3.5\" (1.613 m).    Weight as of this encounter: 131 lb (59.4 kg).           reports that she " has never smoked. She has never used smokeless tobacco.      Appropriate preventive services were discussed with this patient, including applicable screening as appropriate for cardiovascular disease, diabetes, osteopenia/osteoporosis, and glaucoma.  As appropriate for age/gender, discussed screening for colorectal cancer, prostate cancer, breast cancer, and cervical cancer. Checklist reviewing preventive services available has been given to the patient.    Reviewed patients plan of care and provided an AVS. The Intermediate Care Plan ( asthma action plan, low back pain action plan, and migraine action plan) for Cecelia meets the Care Plan requirement. This Care Plan has been established and reviewed with the Patient.    Counseling Resources:  ATP IV Guidelines  Pooled Cohorts Equation Calculator  Breast Cancer Risk Calculator  FRAX Risk Assessment  ICSI Preventive Guidelines  Dietary Guidelines for Americans, 2010  USDA's MyPlate  ASA Prophylaxis  Lung CA Screening    Diya Berry MD  [facility]

## 2018-09-24 ENCOUNTER — TRANSFERRED RECORDS (OUTPATIENT)
Dept: HEALTH INFORMATION MANAGEMENT | Facility: CLINIC | Age: 78
End: 2018-09-24

## 2018-09-24 ENCOUNTER — OFFICE VISIT (OUTPATIENT)
Dept: FAMILY MEDICINE | Facility: CLINIC | Age: 78
End: 2018-09-24
Payer: COMMERCIAL

## 2018-09-24 VITALS
BODY MASS INDEX: 22.36 KG/M2 | WEIGHT: 131 LBS | HEART RATE: 76 BPM | DIASTOLIC BLOOD PRESSURE: 80 MMHG | TEMPERATURE: 98.9 F | SYSTOLIC BLOOD PRESSURE: 124 MMHG | HEIGHT: 64 IN

## 2018-09-24 DIAGNOSIS — F34.1 DYSTHYMIA: ICD-10-CM

## 2018-09-24 DIAGNOSIS — Z13.6 CARDIOVASCULAR SCREENING; LDL GOAL LESS THAN 160: ICD-10-CM

## 2018-09-24 DIAGNOSIS — Z71.89 ADVANCED DIRECTIVES, COUNSELING/DISCUSSION: ICD-10-CM

## 2018-09-24 DIAGNOSIS — Z23 NEED FOR PROPHYLACTIC VACCINATION AND INOCULATION AGAINST INFLUENZA: ICD-10-CM

## 2018-09-24 DIAGNOSIS — Z87.19 HX OF PANCREATITIS: ICD-10-CM

## 2018-09-24 DIAGNOSIS — E03.9 HYPOTHYROIDISM, UNSPECIFIED TYPE: ICD-10-CM

## 2018-09-24 DIAGNOSIS — Z00.00 MEDICARE ANNUAL WELLNESS VISIT, SUBSEQUENT: Primary | ICD-10-CM

## 2018-09-24 LAB
ALBUMIN SERPL-MCNC: 3.4 G/DL (ref 3.4–5)
ALP SERPL-CCNC: 90 U/L (ref 40–150)
ALT SERPL W P-5'-P-CCNC: 20 U/L (ref 0–50)
ANION GAP SERPL CALCULATED.3IONS-SCNC: 7 MMOL/L (ref 3–14)
AST SERPL W P-5'-P-CCNC: 16 U/L (ref 0–45)
BILIRUB SERPL-MCNC: 0.5 MG/DL (ref 0.2–1.3)
BUN SERPL-MCNC: 18 MG/DL (ref 7–30)
CALCIUM SERPL-MCNC: 8.6 MG/DL (ref 8.5–10.1)
CHLORIDE SERPL-SCNC: 103 MMOL/L (ref 94–109)
CHOLEST SERPL-MCNC: 200 MG/DL
CO2 SERPL-SCNC: 26 MMOL/L (ref 20–32)
CREAT SERPL-MCNC: 0.98 MG/DL (ref 0.52–1.04)
GFR SERPL CREATININE-BSD FRML MDRD: 55 ML/MIN/1.7M2
GLUCOSE SERPL-MCNC: 85 MG/DL (ref 70–99)
HDLC SERPL-MCNC: 73 MG/DL
LDLC SERPL CALC-MCNC: 107 MG/DL
LIPASE SERPL-CCNC: 113 U/L (ref 73–393)
NONHDLC SERPL-MCNC: 127 MG/DL
POTASSIUM SERPL-SCNC: 3.8 MMOL/L (ref 3.4–5.3)
PROT SERPL-MCNC: 6.4 G/DL (ref 6.8–8.8)
SODIUM SERPL-SCNC: 136 MMOL/L (ref 133–144)
TRIGL SERPL-MCNC: 98 MG/DL
TSH SERPL DL<=0.005 MIU/L-ACNC: 0.84 MU/L (ref 0.4–4)

## 2018-09-24 PROCEDURE — 36415 COLL VENOUS BLD VENIPUNCTURE: CPT | Performed by: FAMILY MEDICINE

## 2018-09-24 PROCEDURE — 80053 COMPREHEN METABOLIC PANEL: CPT | Performed by: FAMILY MEDICINE

## 2018-09-24 PROCEDURE — G0008 ADMIN INFLUENZA VIRUS VAC: HCPCS | Performed by: FAMILY MEDICINE

## 2018-09-24 PROCEDURE — 83690 ASSAY OF LIPASE: CPT | Performed by: FAMILY MEDICINE

## 2018-09-24 PROCEDURE — 99214 OFFICE O/P EST MOD 30 MIN: CPT | Mod: 25 | Performed by: FAMILY MEDICINE

## 2018-09-24 PROCEDURE — 84443 ASSAY THYROID STIM HORMONE: CPT | Performed by: FAMILY MEDICINE

## 2018-09-24 PROCEDURE — 90662 IIV NO PRSV INCREASED AG IM: CPT | Performed by: FAMILY MEDICINE

## 2018-09-24 PROCEDURE — 99397 PER PM REEVAL EST PAT 65+ YR: CPT | Mod: 25 | Performed by: FAMILY MEDICINE

## 2018-09-24 PROCEDURE — 80061 LIPID PANEL: CPT | Performed by: FAMILY MEDICINE

## 2018-09-24 RX ORDER — ESCITALOPRAM OXALATE 20 MG/1
TABLET ORAL
Qty: 90 TABLET | Refills: 3 | Status: SHIPPED | OUTPATIENT
Start: 2018-09-24

## 2018-09-24 RX ORDER — BUPROPION HYDROCHLORIDE 300 MG/1
TABLET ORAL
Qty: 90 TABLET | Refills: 3 | Status: SHIPPED | OUTPATIENT
Start: 2018-09-24

## 2018-09-24 ASSESSMENT — ANXIETY QUESTIONNAIRES
6. BECOMING EASILY ANNOYED OR IRRITABLE: NOT AT ALL
5. BEING SO RESTLESS THAT IT IS HARD TO SIT STILL: SEVERAL DAYS
7. FEELING AFRAID AS IF SOMETHING AWFUL MIGHT HAPPEN: NOT AT ALL
2. NOT BEING ABLE TO STOP OR CONTROL WORRYING: SEVERAL DAYS
3. WORRYING TOO MUCH ABOUT DIFFERENT THINGS: SEVERAL DAYS
GAD7 TOTAL SCORE: 5
1. FEELING NERVOUS, ANXIOUS, OR ON EDGE: SEVERAL DAYS

## 2018-09-24 ASSESSMENT — PATIENT HEALTH QUESTIONNAIRE - PHQ9: 5. POOR APPETITE OR OVEREATING: SEVERAL DAYS

## 2018-09-24 NOTE — LETTER
My Depression Action Plan  Name: Cecelia Zuirta   Date of Birth 1940  Date: 9/24/2018    My doctor: Diya Berry   My clinic: [address] [address] [phone]          GREEN    ZONE   Good Control    What it looks like:     Things are going generally well. You have normal up s and down s. You may even feel depressed from time to time, but bad moods usually last less than a day.   What you need to do:  1. Continue to care for yourself (see self care plan)  2. Check your depression survival kit and update it as needed  3. Follow your physician s recommendations including any medication.  4. Do not stop taking medication unless you consult with your physician first.           YELLOW         ZONE Getting Worse    What it looks like:     Depression is starting to interfere with your life.     It may be hard to get out of bed; you may be starting to isolate yourself from others.    Symptoms of depression are starting to last most all day and this has happened for several days.     You may have suicidal thoughts but they are not constant.   What you need to do:     1. Call your care team, your response to treatment will improve if you keep your care team informed of your progress. Yellow periods are signs an adjustment may need to be made.     2. Continue your self-care, even if you have to fake it!    3. Talk to someone in your support network    4. Open up your depression survival kit           RED    ZONE Medical Alert - Get Help    What it looks like:     Depression is seriously interfering with your life.     You may experience these or other symptoms: You can t get out of bed most days, can t work or engage in other necessary activities, you have trouble taking care of basic hygiene, or basic responsibilities, thoughts of suicide or death that will not go away, self-injurious behavior.     What you need to do:  1. Call your care team and  request a same-day appointment. If they are not available (weekends or after hours) call your local crisis line, emergency room or 911.            Depression Self Care Plan / Survival Kit    Self-Care for Depression  Here s the deal. Your body and mind are really not as separate as most people think.  What you do and think affects how you feel and how you feel influences what you do and think. This means if you do things that people who feel good do, it will help you feel better.  Sometimes this is all it takes.  There is also a place for medication and therapy depending on how severe your depression is, so be sure to consult with your medical provider and/ or Behavioral Health Consultant if your symptoms are worsening or not improving.     In order to better manage my stress, I will:    Exercise  Get some form of exercise, every day. This will help reduce pain and release endorphins, the  feel good  chemicals in your brain. This is almost as good as taking antidepressants!  This is not the same as joining a gym and then never going! (they count on that by the way ) It can be as simple as just going for a walk or doing some gardening, anything that will get you moving.      Hygiene   Maintain good hygiene (Get out of bed in the morning, Make your bed, Brush your teeth, Take a shower, and Get dressed like you were going to work, even if you are unemployed).  If your clothes don't fit try to get ones that do.    Diet  I will strive to eat foods that are good for me, drink plenty of water, and avoid excessive sugar, caffeine, alcohol, and other mood-altering substances.  Some foods that are helpful in depression are: complex carbohydrates, B vitamins, flaxseed, fish or fish oil, fresh fruits and vegetables.    Psychotherapy  I agree to participate in Individual Therapy (if recommended).    Medication  If prescribed medications, I agree to take them.  Missing doses can result in serious side effects.  I understand that  drinking alcohol, or other illicit drug use, may cause potential side effects.  I will not stop my medication abruptly without first discussing it with my provider.    Staying Connected With Others  I will stay in touch with my friends, family members, and my primary care provider/team.    Use your imagination  Be creative.  We all have a creative side; it doesn t matter if it s oil painting, sand castles, or mud pies! This will also kick up the endorphins.    Witness Beauty  (AKA stop and smell the roses) Take a look outside, even in mid-winter. Notice colors, textures. Watch the squirrels and birds.     Service to others  Be of service to others.  There is always someone else in need.  By helping others we can  get out of ourselves  and remember the really important things.  This also provides opportunities for practicing all the other parts of the program.    Humor  Laugh and be silly!  Adjust your TV habits for less news and crime-drama and more comedy.    Control your stress  Try breathing deep, massage therapy, biofeedback, and meditation. Find time to relax each day.     My support system    Clinic Contact:  Phone number:    Contact 1:  Phone number:    Contact 2:  Phone number:    [employer]eterian/:  Phone number:    Therapist:  Phone number:    Local crisis center:    Phone number:    Other community support:  Phone number:

## 2018-09-24 NOTE — MR AVS SNAPSHOT
After Visit Summary   9/24/2018    Cecelia Zurita    MRN: [id number]           Patient Information     Date Of Birth          1940        Visit Information        Provider Department      9/24/2018 2:00 PM Diya Berry MD [facility]        Today's Diagnoses     Medicare annual wellness visit, subsequent    -  1    CARDIOVASCULAR SCREENING; LDL GOAL LESS THAN 160        Dysthymia        Hx of pancreatitis        Hypothyroidism, unspecified type        Advanced directives, counseling/discussion        Need for prophylactic vaccination and inoculation against influenza          Care Instructions      Preventive Health Recommendations    Female Ages 65 +    Yearly exam:     See your health care provider every year in order to  o Review health changes.   o Discuss preventive care.    o Review your medicines if your doctor has prescribed any.      You no longer need a yearly Pap test unless you've had an abnormal Pap test in the past 10 years. If you have vaginal symptoms, such as bleeding or discharge, be sure to talk with your provider about a Pap test.      Every 1 to 2 years, have a mammogram.  If you are over 69, talk with your health care provider about whether or not you want to continue having screening mammograms.      Every 10 years, have a colonoscopy. Or, have a yearly FIT test (stool test). These exams will check for colon cancer.       Have a cholesterol test every 5 years, or more often if your doctor advises it.       Have a diabetes test (fasting glucose) every three years. If you are at risk for diabetes, you should have this test more often.       At age 65, have a bone density scan (DEXA) to check for osteoporosis (brittle bone disease).    Shots:    Get a flu shot each year.    Get a tetanus shot every 10 years.    Talk to your doctor about your pneumonia vaccines. There are now two you should receive - Pneumovax (PPSV 23) and Prevnar (PCV 13).    Talk to your  pharmacist about the shingles vaccine.    Talk to your doctor about the hepatitis B vaccine.    Nutrition:     Eat at least 5 servings of fruits and vegetables each day.      Eat whole-grain bread, whole-wheat pasta and brown rice instead of white grains and rice.      Get adequate Calcium and Vitamin D.     Lifestyle    Exercise at least 150 minutes a week (30 minutes a day, 5 days a week). This will help you control your weight and prevent disease.      Limit alcohol to one drink per day.      No smoking.       Wear sunscreen to prevent skin cancer.       See your dentist twice a year for an exam and cleaning.      See your eye doctor every 1 to 2 years to screen for conditions such as glaucoma, macular degeneration and cataracts.          Follow-ups after your visit        Additional Services     GASTROENTEROLOGY ADULT REF CONSULT ONLY       Preferred Location: Dr. Nic Perez in [place].       Please be aware that coverage of these services is subject to the terms and limitations of your health insurance plan.  Call member services at your health plan with any benefit or coverage questions.  Any procedures must be performed at a [facility] facility OR coordinated by your clinic's referral office.    Please bring the following with you to your appointment:    (1) Any X-Rays, CTs or MRIs which have been performed.  Contact the facility where they were done to arrange for  prior to your scheduled appointment.    (2) List of current medications   (3) This referral request   (4) Any documents/labs given to you for this referral                  Follow-up notes from your care team     Return in about 1 year (around 9/24/2019).      Future tests that were ordered for you today     Open Future Orders        Priority Expected Expires Ordered    MR Abdomen MRCP w/o & w Contrast Routine  9/24/2019 9/24/2018            Who to contact     Normal or non-critical lab and imaging results will be communicated to you by  "MyChart, letter or phone within 4 business days after the clinic has received the results. If you do not hear from us within 7 days, please contact the clinic through [facility] or phone. If you have a critical or abnormal lab result, we will notify you by phone as soon as possible.  Submit refill requests through [employer] or call your pharmacy and they will forward the refill request to us. Please allow 3 business days for your refill to be completed.          If you need to speak with a  for additional information , please call: [phone]           Additional Information About Your Visit        [employer] Information     [employer] lets you send messages to your doctor, view your test results, renew your prescriptions, schedule appointments and more. To sign up, go to [email or web address] . Click on \"Log in\" on the left side of the screen, which will take you to the Welcome page. Then click on \"Sign up Now\" on the right side of the page.     You will be asked to enter the access code listed below, as well as some personal information. Please follow the directions to create your username and password.     Your access code is: [email or web address]  Expires: 2018 12:31 PM     Your access code will  in 90 days. If you need help or a new code, please call your [place] clinic or [phone].        Care EveryWhere ID     This is your Care EveryWhere ID. This could be used by other organizations to access your [place] medical records  [id number]        Your Vitals Were     Pulse Temperature Height BMI (Body Mass Index)          76 98.9  F (37.2  C) (Tympanic) 5' 3.5\" (1.613 m) 22.84 kg/m2         Blood Pressure from Last 3 Encounters:   18 124/80   17 130/80   16 112/74    Weight from Last 3 Encounters:   18 131 lb (59.4 kg)   17 151 lb 6 oz (68.7 kg)   16 152 lb 12.8 oz (69.3 kg)              We Performed the Following     Comprehensive metabolic panel (BMP + " Alb, Alk Phos, ALT, AST, Total. Bili, TP)     DEPRESSION ACTION PLAN (DAP)     FLU VACCINE, INCREASED ANTIGEN, PRESV FREE, AGE 65+ [91598]     GASTROENTEROLOGY ADULT REF CONSULT ONLY     Lipase     Lipid panel reflex to direct LDL Fasting     TSH     Vaccine Administration, Initial [12004]          Today's Medication Changes          These changes are accurate as of 9/24/18 11:59 PM.  If you have any questions, ask your nurse or doctor.               These medicines have changed or have updated prescriptions.        Dose/Directions    buPROPion 300 MG 24 hr tablet   Commonly known as:  WELLBUTRIN XL   This may have changed:  additional instructions   Used for:  Dysthymia   Changed by:  Diya Berry MD        TAKE 1 TABLET(300 MG) BY MOUTH EVERY MORNING.   Quantity:  90 tablet   Refills:  3       escitalopram 20 MG tablet   Commonly known as:  LEXAPRO   This may have changed:  additional instructions   Used for:  Dysthymia   Changed by:  Diya Berry MD        TAKE 1 TABLET(20 MG) BY MOUTH DAILY.   Quantity:  90 tablet   Refills:  3            Where to get your medicines      These medications were sent to [facility] [address] [place] [address] [facility] AT [place] [address] [address] [facility], [facility] [address]     Phone:  [phone]     buPROPion 300 MG 24 hr tablet    escitalopram 20 MG tablet                Primary Care Provider Office Phone # Fax #    Diya Berry [phone] [address] [place] [facility] [address]        Equal Access to Services     [facility] AH: Hadii aad ku hadasho Soomaali, waaxda luqadaha, qaybta kaalmada adeegyada, waxay lady haykim flor . So [facility] [phone].    ATENCIÓN: Si habla español, tiene a wells disposición servicios gratuitos de asistencia lingüística. Llame al [phone].    We comply with applicable federal civil rights laws and [place] laws. We do not discriminate on the basis  of race, color, national origin, age, disability, sex, sexual orientation, or gender identity.            Thank you!     Thank you for choosing [facility]  for your care. Our goal is always to provide you with excellent care. Hearing back from our patients is one way we can continue to improve our services. Please take a few minutes to complete the written survey that you may receive in the mail after your visit with us. Thank you!             Your Updated Medication List - Protect others around you: Learn how to safely use, store and throw away your medicines at www.disposemymeds.org.          This list is accurate as of 9/24/18 11:59 PM.  Always use your most recent med list.                   Brand Name Dispense Instructions for use Diagnosis    B COMPLEX PO      Take  by mouth.        buPROPion 300 MG 24 hr tablet    WELLBUTRIN XL    90 tablet    TAKE 1 TABLET(300 MG) BY MOUTH EVERY MORNING.    Dysthymia       escitalopram 20 MG tablet    LEXAPRO    90 tablet    TAKE 1 TABLET(20 MG) BY MOUTH DAILY.    Dysthymia       meclizine 25 MG tablet    ANTIVERT    30 tablet    Take 1 tablet by mouth every 6 hours as needed. For vertigo    Vertigo       NATURE-THROID 65 MG Tabs   Generic drug:  Thyroid      97.5 Doses        OMEGA 3 PO      Take  by mouth. Omega Red daily.        omeprazole 20 MG tablet     90 tablet    Take 1 tablet (20 mg) by mouth daily For stomach.    Gastric hyperacidity       VITAMIN D3 PO      Take 2,000 Units by mouth daily

## 2018-09-24 NOTE — PROGRESS NOTES

## 2018-09-25 ASSESSMENT — ANXIETY QUESTIONNAIRES: GAD7 TOTAL SCORE: 5

## 2020-05-23 ENCOUNTER — APPOINTMENT (OUTPATIENT)
Dept: ULTRASOUND IMAGING | Facility: CLINIC | Age: 80
End: 2020-05-23
Attending: FAMILY MEDICINE
Payer: MEDICARE

## 2020-05-23 ENCOUNTER — HOSPITAL ENCOUNTER (EMERGENCY)
Facility: CLINIC | Age: 80
Discharge: HOME | End: 2020-05-23
Attending: FAMILY MEDICINE | Admitting: FAMILY MEDICINE
Payer: MEDICARE

## 2020-05-23 VITALS
OXYGEN SATURATION: 96 % | WEIGHT: 140 LBS | BODY MASS INDEX: 24.41 KG/M2 | DIASTOLIC BLOOD PRESSURE: 90 MMHG | SYSTOLIC BLOOD PRESSURE: 165 MMHG | TEMPERATURE: 97.3 F

## 2020-05-23 DIAGNOSIS — R60.0 LEG EDEMA, LEFT: ICD-10-CM

## 2020-05-23 PROCEDURE — 99284 EMERGENCY DEPT VISIT MOD MDM: CPT | Mod: 25 | Performed by: FAMILY MEDICINE

## 2020-05-23 PROCEDURE — 99284 EMERGENCY DEPT VISIT MOD MDM: CPT | Mod: Z6 | Performed by: FAMILY MEDICINE

## 2020-05-23 PROCEDURE — 93971 EXTREMITY STUDY: CPT | Mod: LT

## 2020-05-23 ASSESSMENT — ENCOUNTER SYMPTOMS
SINUS PRESSURE: 0
DYSURIA: 0
COUGH: 0
WHEEZING: 0
DIAPHORESIS: 0
BLOOD IN STOOL: 0
VOMITING: 0
FREQUENCY: 0
PALPITATIONS: 0
DIARRHEA: 0
FEVER: 0
HEADACHES: 0
CONSTIPATION: 0
ABDOMINAL PAIN: 0
SHORTNESS OF BREATH: 0
SORE THROAT: 0
NAUSEA: 0
CHILLS: 0

## 2020-05-23 NOTE — ED PROVIDER NOTES
History   No chief complaint on file.    PAIGE Zurita is a 79 year old female who presents with history of hypothyroidism, pancreatitis.   She presents today with lower extremity swelling on the left side.  She just returned from [place] in the last week by driving cross-country.  She noted that the swelling has gradually increased since that time but was significantly more in the last day and that she wore sandals instead of shoes as this shoes did not fit well on this left foot due to swelling.  She had a burning sensation in the posterior calf and a sense of calf fullness.  She has had no dyspnea and no associated chest pain.  No history of venous thromboembolism.  No known cancer.  She has no fever.  There is no trauma to the leg.  No rash.  This leg did feel more warm than the other side.  No skin breaks or lacerations.  She notes no recent illness.  No COVID-like symptoms.  No cough congestion upper respiratory symptoms or fever.  She has no orthopnea, no renal disease or liver disease      Allergies:  Allergies   Allergen Reactions     Amoxicillin Rash     Asa [Aspirin] Swelling     ASA Throat Swells     Penicillins Rash       Problem List:    Patient Active Problem List    Diagnosis Date Noted     Hx of pancreatitis 09/24/2018     Priority: Medium     Vitamin D deficiency 06/10/2013     Priority: Medium     Dahlia 10, 2013 resolved, now 37.        Seborrheic dermatitis 11/13/2012     Priority: Medium     CARDIOVASCULAR SCREENING; LDL GOAL LESS THAN 160 10/31/2010     Priority: Medium     Dysthymia 09/20/2010     Priority: Medium     August 21, 2014   on SSRI and wellbutrin combination, notes some malaise, social isolation. Will increase wellbutrin to 300 mg daily, continue on Celexa 20 mg daily. Follow up one month.        Hypothyroidism 09/20/2010     Priority: Medium     August 21, 2014  probably Graves, treated with radioactive iodine.  clinically, euthyroid. TSH: 1.23. One year refill. No change in  thyroid dose.        Advanced directives, counseling/discussion 06/11/2013     Priority: Low     Advance Care Planning:   ACP Review and Resources Provided:  HC info packet sent 6/11/2013           24 hour handout given 06/08/2012     Priority: Low        Past Medical History:    No past medical history on file.    Past Surgical History:    Past Surgical History:   Procedure Laterality Date     C APPENDECTOMY  1949    Appendectomy     C LIGATE FALLOPIAN TUBE  1968    Tubal Ligation     COLONOSCOPY  6/2/2004    Colonoscopy     HC BIOPSY OF BREAST, OPEN INCISIONAL  6/13/1990    Breast Biopsy     HC REMOVAL GALLBLADDER  1963    Cholecystectomy     PELVIS LAPAROSCOPY,DX  5/4/1989    Laparoscopy with drainage of left hydrosalpinx     SURGICAL HISTORY OF -   1991    Hysterectomy n oophorectomy        Family History:    Family History   Problem Relation Age of Onset     Cerebrovascular Disease Mother      Hypertension Mother      Cerebrovascular Disease Father      Hypertension Father      Cerebrovascular Disease Maternal Grandmother      Cancer Maternal Grandmother         uterine     Diabetes No family hx of      C.A.D. No family hx of      Breast Cancer No family hx of      Cancer - colorectal No family hx of      Hypertension Son      Other - See Comments Daughter         Fibromyalgia, Chronic Fatigue       Social History:  Marital Status:   [5]  Social History     Tobacco Use     Smoking status: Never Smoker     Smokeless tobacco: Never Used   Substance Use Topics     Alcohol use: Yes     Comment: 2-3 drinks per week     Drug use: No        Medications:    B Complex Vitamins (B COMPLEX PO)  buPROPion (WELLBUTRIN XL) 300 MG 24 hr tablet  Cholecalciferol (VITAMIN D3 PO)  escitalopram (LEXAPRO) 20 MG tablet  meclizine (ANTIVERT) 25 MG tablet  NATURE-THROID 65 MG TABS  Omega-3 Fatty Acids (OMEGA 3 PO)  omeprazole 20 MG tablet          Review of Systems   Constitutional: Negative for chills, diaphoresis and fever.    HENT: Negative for ear pain, sinus pressure and sore throat.    Eyes: Negative for visual disturbance.   Respiratory: Negative for cough, shortness of breath and wheezing.    Cardiovascular: Positive for leg swelling. Negative for chest pain and palpitations.   Gastrointestinal: Negative for abdominal pain, blood in stool, constipation, diarrhea, nausea and vomiting.   Genitourinary: Negative for dysuria, frequency and urgency.   Skin: Negative for rash.   Neurological: Negative for headaches.   All other systems reviewed and are negative.      Physical Exam          Physical Exam  Constitutional:       General: She is in acute distress.      Appearance: She is not diaphoretic.   Neck:      Musculoskeletal: Neck supple.   Cardiovascular:      Rate and Rhythm: Normal rate and regular rhythm.      Pulses: Normal pulses.      Heart sounds: Normal heart sounds.   Pulmonary:      Effort: Pulmonary effort is normal. No respiratory distress.      Breath sounds: Normal breath sounds. No stridor. No wheezing or rhonchi.   Musculoskeletal:      Right lower leg: No edema.      Left lower leg: Edema present.   Skin:     Coloration: Skin is not pale.      Findings: No rash.   Neurological:      Mental Status: She is alert.     Normal office bilaterally dorsalis pedis posterior tibial.  No discoloration.  No obvious significant varicose veins.  The swelling is evident of the left between the 2 sides but not marked.  There is normal range of motion ankle and foot as well as knee.  Knee landmarks are easily seen.  No obvious rash or cellulitis.  No signs of trauma.  No ecchymosis.  No deformity.  No specific tenderness to palpation although some tenderness of the left calf that is mild.      ED Course        Procedures               Critical Care time:  none               No results found for this or any previous visit (from the past 24 hour(s)).    Medications - No data to display    Assessments & Plan (with Medical Decision  Making)     MDM: Cecelia Zurita is a 79 year old female who presents with lower extremity swelling left side.  Onset in the last few days after returning by car from [place] long distance.  No associated cardiopulmonary symptoms and no VTE risks otherwise.  She has an examination that shows some mild edema on this side.  Recommended that we perform venous ultrasound lower extremity left side.  If this is negative could consider repeat ultrasound in 1 week given the lower test sensitivity of venous ultrasound of the calf.  Precautions are also given for return.    I have reviewed the nursing notes.    I have reviewed the findings, diagnosis, plan and need for follow up with the patient.       New Prescriptions    No medications on file       Final diagnoses:   None       5/23/2020   [facility] EMERGENCY DEPARTMENT     Isra López MD  05/23/20 [id number]

## 2020-05-23 NOTE — DISCHARGE INSTRUCTIONS
ICD-10-CM    1. Leg edema, left  R60.0     No DVT today by ultrasound.  follow-up one week and consider repeat ultrassound in one week as calf clots may be missed.  observe for signs infection- look for local redness, streaking redness up the leg, fever - and return.  Alsio if both legs are significantly swollen, fanta with shortness of breath, return for evaluation.

## 2020-05-23 NOTE — ED AVS SNAPSHOT
[facility] Emergency Department  [address] [facility] [address]  Phone:  [phone]  Fax:  [phone]                                    Cecelia Zurita   MRN: [id number]    Department:  [facility] Emergency Department   Date of Visit:  5/23/2020           After Visit Summary Signature Page    I have received my discharge instructions, and my questions have been answered. I have discussed any challenges I see with this plan with the nurse or doctor.    ..........................................................................................................................................  Patient/Patient Representative Signature      ..........................................................................................................................................  Patient Representative Print Name and Relationship to Patient    ..................................................               ................................................  Date                                   Time    ..........................................................................................................................................  Reviewed by Signature/Title    ...................................................              ..............................................  Date                                               Time          22EPIC Rev 08/18